# Patient Record
Sex: MALE | Race: WHITE | Employment: FULL TIME | URBAN - NONMETROPOLITAN AREA
[De-identification: names, ages, dates, MRNs, and addresses within clinical notes are randomized per-mention and may not be internally consistent; named-entity substitution may affect disease eponyms.]

---

## 2021-04-25 ENCOUNTER — HOSPITAL ENCOUNTER (EMERGENCY)
Age: 26
Discharge: HOME | End: 2021-04-25
Payer: COMMERCIAL

## 2021-04-25 VITALS
WEIGHT: 215 LBS | HEIGHT: 76 IN | OXYGEN SATURATION: 97 % | DIASTOLIC BLOOD PRESSURE: 78 MMHG | BODY MASS INDEX: 26.18 KG/M2 | RESPIRATION RATE: 16 BRPM | HEART RATE: 75 BPM | SYSTOLIC BLOOD PRESSURE: 136 MMHG | TEMPERATURE: 97.4 F

## 2021-04-25 DIAGNOSIS — L25.5 ALLERGIC DERMATITIS DUE TO POISON VINE: Primary | ICD-10-CM

## 2021-04-25 PROCEDURE — 6360000002 HC RX W HCPCS: Performed by: NURSE PRACTITIONER

## 2021-04-25 PROCEDURE — 99203 OFFICE O/P NEW LOW 30 MIN: CPT

## 2021-04-25 PROCEDURE — 96372 THER/PROPH/DIAG INJ SC/IM: CPT

## 2021-04-25 PROCEDURE — 99202 OFFICE O/P NEW SF 15 MIN: CPT | Performed by: NURSE PRACTITIONER

## 2021-04-25 RX ORDER — DIPHENHYDRAMINE HCL 25 MG
25 CAPSULE ORAL EVERY 6 HOURS PRN
Refills: 0 | COMMUNITY
Start: 2021-04-25 | End: 2021-05-05

## 2021-04-25 RX ORDER — SKIN CLEANSER COMB NO.41
CLEANSER (ML) TOPICAL
Refills: 0 | COMMUNITY
Start: 2021-04-25

## 2021-04-25 RX ORDER — METHYLPREDNISOLONE ACETATE 80 MG/ML
80 INJECTION, SUSPENSION INTRA-ARTICULAR; INTRALESIONAL; INTRAMUSCULAR; SOFT TISSUE ONCE
Status: COMPLETED | OUTPATIENT
Start: 2021-04-25 | End: 2021-04-25

## 2021-04-25 RX ORDER — PREDNISONE 20 MG/1
TABLET ORAL
Qty: 15 TABLET | Refills: 0 | Status: SHIPPED | OUTPATIENT
Start: 2021-04-25 | End: 2021-05-05

## 2021-04-25 RX ADMIN — METHYLPREDNISOLONE ACETATE 80 MG: 80 INJECTION, SUSPENSION INTRA-ARTICULAR; INTRALESIONAL; INTRAMUSCULAR; SOFT TISSUE at 17:03

## 2021-04-25 ASSESSMENT — ENCOUNTER SYMPTOMS
COUGH: 0
CHOKING: 0
CHEST TIGHTNESS: 0
APNEA: 0
COLOR CHANGE: 0
STRIDOR: 0
SHORTNESS OF BREATH: 0
WHEEZING: 0

## 2021-04-25 NOTE — ED NOTES
1730 VS WNL. Injection site is clean, dry and intact. No redness or edema noted. Reviewed discharge instructions and prescriptions with patient and wife. Voiced understanding.      Hever Baig RN  04/25/21 5820

## 2021-04-25 NOTE — ED PROVIDER NOTES
[facility]  Urgent Care Encounter      CHIEF COMPLAINT       Chief Complaint   Patient presents with    Rash     possible poison ivy onset x 4 days progressively worse original site right forearm       Nurses Notes reviewed and I agree except as noted in the HPI. HISTORY OFPRESENT ILLNESS   Mary Ellen Acosta is a 27 y.o. The history is provided by the patient. No  was used. Rash  Location:  Face, shoulder/arm and leg  Facial rash location:  Forehead  Shoulder/arm rash location:  R forearm and L forearm  Leg rash location:  R lower leg and L lower leg  Quality: itchiness, redness and swelling    Quality: not blistering, not bruising, not burning, not draining, not dry, not painful, not peeling, not scaling and not weeping    Severity:  Moderate  Onset quality:  Sudden  Duration:  4 days  Timing:  Constant  Progression:  Spreading  Chronicity:  New  Context: animal contact, insect bite/sting, plant contact and sun exposure    Context: not chemical exposure, not diapers, not eggs, not exposure to similar rash, not food, not hot tub use, not medications, not new detergent/soap, not nuts, not pollen, not pregnancy and not sick contacts    Associated symptoms: no fatigue, no fever, no headaches, no shortness of breath and not wheezing        REVIEW OF SYSTEMS     Review of Systems   Constitutional: Negative for activity change, appetite change, chills, diaphoresis, fatigue, fever and unexpected weight change. Respiratory: Negative for apnea, cough, choking, chest tightness, shortness of breath, wheezing and stridor. Cardiovascular: Negative for chest pain, palpitations and leg swelling. Skin: Positive for rash. Negative for color change, pallor and wound. Neurological: Negative for dizziness, light-headedness and headaches.        PAST MEDICAL HISTORY         Diagnosis Date    Seasonal allergies        SURGICAL HISTORY     Patient  has a past surgical history that includes Achilles tendon surgery (Left, 2019) and Appendectomy (1999). CURRENT MEDICATIONS       Previous Medications    FLUTICASONE PROPIONATE (FLONASE NA)    1 Squirt by Each Nostril route daily       ALLERGIES     Patient is has No Known Allergies. FAMILY HISTORY     Patient's family history is not on file. SOCIAL HISTORY     Patient  reports that he has never smoked. He has never used smokeless tobacco. He reports previous alcohol use. PHYSICAL EXAM     ED TRIAGE VITALS  BP: (!) 152/72, Temp: 97.4 °F (36.3 °C), Pulse: 77, Resp: 16, SpO2: 98 %  Physical Exam  Vitals signs and nursing note reviewed. Exam conducted with a chaperone present. Constitutional:       General: He is not in acute distress. Appearance: Normal appearance. He is normal weight. He is not ill-appearing, toxic-appearing or diaphoretic. HENT:      Head: Normocephalic and atraumatic. Right Ear: External ear normal.      Left Ear: External ear normal.   Eyes:      Extraocular Movements: Extraocular movements intact. Conjunctiva/sclera: Conjunctivae normal.   Neck:      Musculoskeletal: Normal range of motion. Pulmonary:      Effort: Pulmonary effort is normal.   Musculoskeletal: Normal range of motion. Skin:     General: Skin is warm. Findings: Rash present. Neurological:      General: No focal deficit present. Mental Status: He is alert and oriented to person, place, and time. Psychiatric:         Mood and Affect: Mood normal.         Behavior: Behavior normal.         Thought Content: Thought content normal.         Judgment: Judgment normal.         DIAGNOSTIC RESULTS   Labs:No results found for this visit on 04/25/21.     IMAGING:  No orders to display     URGENT CARE COURSE:     Vitals:    04/25/21 1620   BP: (!) 152/72   Pulse: 77   Resp: 16   Temp: 97.4 °F (36.3 °C)   TempSrc: Temporal   SpO2: 98%   Weight: 215 lb (97.5 kg)   Height: 6' 4\" (1.93 m)       Medications   methylPREDNISolone acetate (DEPO-MEDROL) injection 80 mg (80 mg Intramuscular Given 4/25/21 0341)     PROCEDURES:  None  FINAL IMPRESSION      1. Allergic dermatitis due to poison vine        DISPOSITION/PLAN   Decision To Discharge    Take Medication as Directed  Benadryl for itch, Don't scratch  Monitor for any increase in size or spreading  Monitor for fever or chills  Keep drainage covered if any. Follow up with your PCP or return as needed  Or go to the emergency Department    PATIENT REFERRED TO:  [address] [place] [id number]  Call today  [phone]    DISCHARGE MEDICATIONS:  New Prescriptions    DIPHENHYDRAMINE (BENADRYL) 25 MG CAPSULE    Take 1 capsule by mouth every 6 hours as needed for Itching    POISON IVY TREATMENTS (TECNU OUTDOOR SKIN CLEANSER) LIQD    Follow package directions for topical use    PREDNISONE (DELTASONE) 20 MG TABLET    Take 2 tablets by mouth daily for 5 days, THEN 1 tablet daily for 5 days.      Current Discharge Medication List          IZABELA Anderson CNP, APRN - CNP  04/25/21 3673

## 2021-05-07 ENCOUNTER — HOSPITAL ENCOUNTER (EMERGENCY)
Age: 26
Discharge: HOME | End: 2021-05-07
Payer: COMMERCIAL

## 2021-05-07 VITALS
HEART RATE: 68 BPM | BODY MASS INDEX: 26.17 KG/M2 | RESPIRATION RATE: 18 BRPM | WEIGHT: 215 LBS | SYSTOLIC BLOOD PRESSURE: 139 MMHG | OXYGEN SATURATION: 98 % | TEMPERATURE: 98.6 F | DIASTOLIC BLOOD PRESSURE: 85 MMHG

## 2021-05-07 DIAGNOSIS — L25.5 CONTACT DERMATITIS DUE TO PLANT: Primary | ICD-10-CM

## 2021-05-07 DIAGNOSIS — L03.115 CELLULITIS OF RIGHT LOWER EXTREMITY: ICD-10-CM

## 2021-05-07 PROCEDURE — 99213 OFFICE O/P EST LOW 20 MIN: CPT | Performed by: NURSE PRACTITIONER

## 2021-05-07 PROCEDURE — 99213 OFFICE O/P EST LOW 20 MIN: CPT

## 2021-05-07 RX ORDER — PREDNISONE 20 MG/1
TABLET ORAL
Qty: 42 TABLET | Refills: 0 | Status: SHIPPED | OUTPATIENT
Start: 2021-05-07

## 2021-05-07 ASSESSMENT — ENCOUNTER SYMPTOMS
SHORTNESS OF BREATH: 0
COLOR CHANGE: 1
COUGH: 0
EYE REDNESS: 0
EYE ITCHING: 0

## 2021-05-07 NOTE — ED PROVIDER NOTES
[id number]       Chief Complaint   Patient presents with    Rash     right, lower leg       Nurses Notes reviewed and I agree except as noted in the HPI. HISTORY OF PRESENT ILLNESS   Gerson Condon is a 27 y.o. male who presents for re-evaluation of poison ivy, it is spreading. He states that he was seen here in the urgent care almost 2 weeks ago and completed the medications as directed but he is no better. REVIEW OF SYSTEMS     Review of Systems   Constitutional: Negative for chills and fever. Eyes: Negative for redness and itching. Respiratory: Negative for cough and shortness of breath. Cardiovascular: Negative for chest pain. Skin: Positive for color change and rash. Allergic/Immunologic: Negative for environmental allergies and food allergies. Neurological: Negative for dizziness and headaches. PAST MEDICAL HISTORY         Diagnosis Date    Seasonal allergies        SURGICAL HISTORY     Patient  has a past surgical history that includes Achilles tendon surgery (Left, 2019) and Appendectomy (1999). CURRENT MEDICATIONS       Discharge Medication List as of 5/7/2021 10:39 AM      CONTINUE these medications which have NOT CHANGED    Details   Fluticasone Propionate (FLONASE NA) 1 Squirt by Each Nostril route dailyHistorical Med      Poison Ivy Treatments (TECNU OUTDOOR SKIN CLEANSER) LIQD Follow package directions for topical use, [id number]             ALLERGIES     Patient is has No Known Allergies. FAMILY HISTORY     Patient'sfamily history is not on file. SOCIAL HISTORY     Patient  reports that he has never smoked. He has never used smokeless tobacco. He reports previous alcohol use. PHYSICAL EXAM     ED TRIAGE VITALS  BP: 139/85, Temp: 98.6 °F (37 °C), Pulse: 68, Resp: 18, SpO2: 98 %  Physical Exam  Vitals signs and nursing note reviewed. Constitutional:       General: He is not in acute distress. Appearance: Normal appearance. He is well-developed and well-groomed. HENT:      Head: Normocephalic and atraumatic. Right Ear: External ear normal.      Left Ear: External ear normal.      Mouth/Throat:      Lips: Pink. Mouth: Mucous membranes are moist.   Eyes:      Conjunctiva/sclera: Conjunctivae normal.      Right eye: Right conjunctiva is not injected. Left eye: Left conjunctiva is not injected. Pupils: Pupils are equal.   Neck:      Musculoskeletal: Normal range of motion. Cardiovascular:      Rate and Rhythm: Normal rate. Pulmonary:      Effort: Pulmonary effort is normal. No respiratory distress. Skin:     General: Skin is warm and dry. Findings: Rash (consistent with posion ivy scattered on arms and legs, secondary infection noted to the right upper thigh ) present. Neurological:      Mental Status: He is alert and oriented to person, place, and time. Psychiatric:         Speech: Speech normal.         Behavior: Behavior is cooperative. DIAGNOSTIC RESULTS   Labs:  Abnormal Labs Reviewed - No data to display     IMAGING:  No orders to display     URGENT CARE COURSE:     Vitals:    05/07/21 1030   BP: 139/85   Pulse: 68   Resp: 18   Temp: 98.6 °F (37 °C)   TempSrc: Temporal   SpO2: 98%   Weight: 215 lb (97.5 kg)       Medications - No data to display  PROCEDURES:  FINALIMPRESSION      1. Contact dermatitis due to plant    2. Cellulitis of right lower extremity        DISPOSITION/PLAN   DISPOSITION Decision To Discharge 05/07/2021 10:37:24 AM      Physical assessment findings, diagnostic testing(s) if applicable, and vital signs reviewed with patient/patient representative. Questions answered. If applicable, patient/patient representative will be contacted upon receipt of final culture and sensitivity or other testing results when available. Any additions or changes to medications or changes the plan of care will be made at that time.   Medications as directed,

## 2021-05-07 NOTE — ED TRIAGE NOTES
Patient to room with c/o red, raised, itchy rash to right thigh and left low leg begging a week and a half ago. Patient noted additional areas to left left and neck beginning this week. States previous exposure to poison ivy. Completed previously prescribed steroids.

## 2023-01-17 ENCOUNTER — OFFICE VISIT (OUTPATIENT)
Dept: FAMILY MEDICINE CLINIC | Age: 28
End: 2023-01-17
Payer: COMMERCIAL

## 2023-01-17 VITALS
OXYGEN SATURATION: 97 % | TEMPERATURE: 98.8 F | SYSTOLIC BLOOD PRESSURE: 110 MMHG | HEART RATE: 76 BPM | DIASTOLIC BLOOD PRESSURE: 70 MMHG | RESPIRATION RATE: 16 BRPM

## 2023-01-17 DIAGNOSIS — J01.90 ACUTE BACTERIAL SINUSITIS: Primary | ICD-10-CM

## 2023-01-17 DIAGNOSIS — B96.89 ACUTE BACTERIAL SINUSITIS: Primary | ICD-10-CM

## 2023-01-17 PROCEDURE — 99203 OFFICE O/P NEW LOW 30 MIN: CPT | Performed by: NURSE PRACTITIONER

## 2023-01-17 RX ORDER — DOXYCYCLINE HYCLATE 100 MG
100 TABLET ORAL 2 TIMES DAILY
Qty: 14 TABLET | Refills: 0 | Status: SHIPPED | OUTPATIENT
Start: 2023-01-17 | End: 2023-01-24

## 2023-01-17 ASSESSMENT — PATIENT HEALTH QUESTIONNAIRE - PHQ9
SUM OF ALL RESPONSES TO PHQ QUESTIONS 1-9: 0
SUM OF ALL RESPONSES TO PHQ QUESTIONS 1-9: 0
2. FEELING DOWN, DEPRESSED OR HOPELESS: 0
SUM OF ALL RESPONSES TO PHQ9 QUESTIONS 1 & 2: 0
SUM OF ALL RESPONSES TO PHQ QUESTIONS 1-9: 0
1. LITTLE INTEREST OR PLEASURE IN DOING THINGS: 0
SUM OF ALL RESPONSES TO PHQ QUESTIONS 1-9: 0

## 2023-01-17 NOTE — PROGRESS NOTES
[address] [address]  Dept: [phone]  Dept Fax: [phone]  Loc: [id number] Shira Snow is a 32 y.o. male who presents todayfor Cough (X 2-3 weeks - productive at times ) and Congestion      HPI:      Patient presents with:  Cough: X 2-3 weeks - productive at times   Congestion          The patient has No Known Allergies. Past MedicalHistory  Jose Chapa  has a past medical history of Seasonal allergies. Medications    Current Outpatient Medications:     doxycycline hyclate (VIBRA-TABS) 100 MG tablet, Take 1 tablet by mouth 2 times daily for 7 days, Disp: 14 tablet, Rfl: 0    Subjective:      Review of Systems   Constitutional:  Negative for activity change, appetite change, fatigue, fever and unexpected weight change. HENT:  Positive for congestion. Negative for dental problem, ear pain, facial swelling, mouth sores, postnasal drip, sinus pressure, sinus pain, sore throat and trouble swallowing. Eyes:  Negative for pain, discharge, redness, itching and visual disturbance. Respiratory:  Positive for cough. Negative for chest tightness, shortness of breath and wheezing. Cardiovascular:  Negative for chest pain, palpitations and leg swelling. Gastrointestinal:  Negative for abdominal distention, abdominal pain, blood in stool, constipation, diarrhea, nausea, rectal pain and vomiting. Endocrine: Negative for cold intolerance and heat intolerance. Genitourinary:  Negative for difficulty urinating, dysuria, frequency, hematuria, penile pain, scrotal swelling, testicular pain and urgency. Musculoskeletal:  Negative for arthralgias, back pain, gait problem and neck pain. Skin:  Negative for color change, rash and wound. Neurological:  Negative for dizziness, seizures, weakness, light-headedness, numbness and headaches. Psychiatric/Behavioral:  Negative for agitation and sleep disturbance. Objective:        Vitals:    01/17/23 1432   BP: 110/70   Site: Left Upper Arm   Position: Sitting   Pulse: 76   Resp: 16   Temp: 98.8 °F (37.1 °C)   TempSrc: Oral   SpO2: 97%      Physical Exam  Vitals and nursing note reviewed. Constitutional:       General: He is not in acute distress. Appearance: He is well-developed. He is not diaphoretic. HENT:      Head: Normocephalic and atraumatic. Right Ear: Tympanic membrane and external ear normal. Tympanic membrane is not injected or erythematous. Left Ear: Tympanic membrane and external ear normal. Tympanic membrane is not injected or erythematous. Nose: Congestion present. Right Sinus: Maxillary sinus tenderness and frontal sinus tenderness present. Left Sinus: Maxillary sinus tenderness and frontal sinus tenderness present. Mouth/Throat:      Pharynx: Uvula midline. Eyes:      General:         Right eye: No discharge. Left eye: No discharge. Conjunctiva/sclera: Conjunctivae normal.      Pupils: Pupils are equal, round, and reactive to light. Neck:      Thyroid: No thyromegaly. Trachea: Trachea normal.   Cardiovascular:      Rate and Rhythm: Normal rate and regular rhythm. Pulses: Normal pulses. Heart sounds: Normal heart sounds, S1 normal and S2 normal. No murmur heard. No friction rub. No gallop. Pulmonary:      Effort: Pulmonary effort is normal. No respiratory distress. Breath sounds: Normal breath sounds. No wheezing or rales. Chest:      Chest wall: No tenderness. Abdominal:      General: Bowel sounds are normal. There is no distension. Palpations: Abdomen is soft. There is no mass. Tenderness: There is no abdominal tenderness. There is no guarding or rebound. Musculoskeletal:         General: No tenderness or deformity. Normal range of motion. Cervical back: Full passive range of motion without pain, normal range of motion and neck supple.    Lymphadenopathy: Cervical: No cervical adenopathy. Skin:     General: Skin is warm and dry. Capillary Refill: Capillary refill takes less than 2 seconds. Neurological:      Mental Status: He is alert and oriented to person, place, and time. Deep Tendon Reflexes: Reflexes are normal and symmetric. Assessment/Plan:       Kaylie Johnson was seen today for cough and congestion. Diagnoses and all orders for this visit:    Acute bacterial sinusitis  -     doxycycline hyclate (VIBRA-TABS) 100 MG tablet; Take 1 tablet by mouth 2 times daily for 7 days  Pt non toxic appearing and in no acute distress. Exam consistent with bacterial sinusitis. Rx Doxy  Patient instructed to increase fluids and rest. Use Tylenol and or Ibuprofen for fever or pain control. Good hand washing encouraged. Health maintenance labs and vaccines declined at today's visit. Return in about 1 week (around 1/24/2023), or if symptoms worsen or fail to improve. Patient instructions given and reviewed.     Electronicallysigned by IZABELA Benitez CNP on 1/23/2023 at 10:16 AM

## 2023-01-23 ASSESSMENT — ENCOUNTER SYMPTOMS
EYE REDNESS: 0
TROUBLE SWALLOWING: 0
COLOR CHANGE: 0
SINUS PRESSURE: 0
FACIAL SWELLING: 0
CONSTIPATION: 0
EYE PAIN: 0
SINUS PAIN: 0
SORE THROAT: 0
SHORTNESS OF BREATH: 0
ABDOMINAL PAIN: 0
DIARRHEA: 0
RECTAL PAIN: 0
BACK PAIN: 0
EYE ITCHING: 0
NAUSEA: 0
BLOOD IN STOOL: 0
CHEST TIGHTNESS: 0
COUGH: 1
VOMITING: 0
EYE DISCHARGE: 0
WHEEZING: 0
ABDOMINAL DISTENTION: 0

## 2024-07-04 ENCOUNTER — APPOINTMENT (OUTPATIENT)
Dept: ULTRASOUND IMAGING | Age: 29
End: 2024-07-04
Payer: COMMERCIAL

## 2024-07-04 ENCOUNTER — HOSPITAL ENCOUNTER (EMERGENCY)
Age: 29
Discharge: HOME | End: 2024-07-04
Attending: STUDENT IN AN ORGANIZED HEALTH CARE EDUCATION/TRAINING PROGRAM
Payer: COMMERCIAL

## 2024-07-04 VITALS
SYSTOLIC BLOOD PRESSURE: 112 MMHG | HEART RATE: 61 BPM | OXYGEN SATURATION: 97 % | DIASTOLIC BLOOD PRESSURE: 57 MMHG | RESPIRATION RATE: 18 BRPM | TEMPERATURE: 98.2 F

## 2024-07-04 DIAGNOSIS — N45.1 EPIDIDYMITIS: Primary | ICD-10-CM

## 2024-07-04 LAB
BILIRUB UR QL STRIP.AUTO: NEGATIVE
CHARACTER UR: CLEAR
COLOR, UA: YELLOW
GLUCOSE UR QL STRIP.AUTO: NEGATIVE MG/DL
HGB UR QL STRIP.AUTO: NEGATIVE
KETONES UR QL STRIP.AUTO: NEGATIVE
NITRITE UR QL STRIP: NEGATIVE
PH UR STRIP.AUTO: 7 [PH] (ref 5–9)
PROT UR STRIP.AUTO-MCNC: NEGATIVE MG/DL
SP GR UR REFRACT.AUTO: 1.02 (ref 1–1.03)
UROBILINOGEN, URINE: 0.2 EU/DL (ref 0–1)
WBC #/AREA URNS HPF: NEGATIVE /[HPF]

## 2024-07-04 PROCEDURE — 76870 US EXAM SCROTUM: CPT

## 2024-07-04 PROCEDURE — 81003 URINALYSIS AUTO W/O SCOPE: CPT

## 2024-07-04 PROCEDURE — 99284 EMERGENCY DEPT VISIT MOD MDM: CPT

## 2024-07-04 PROCEDURE — 6370000000 HC RX 637 (ALT 250 FOR IP)

## 2024-07-04 RX ORDER — HYDROCODONE BITARTRATE AND ACETAMINOPHEN 5; 325 MG/1; MG/1
1 TABLET ORAL ONCE
Status: DISCONTINUED | OUTPATIENT
Start: 2024-07-04 | End: 2024-07-04

## 2024-07-04 RX ORDER — ACETAMINOPHEN 500 MG
1000 TABLET ORAL ONCE
Status: COMPLETED | OUTPATIENT
Start: 2024-07-04 | End: 2024-07-04

## 2024-07-04 RX ORDER — LEVOFLOXACIN 500 MG/1
500 TABLET, FILM COATED ORAL DAILY
Qty: 10 TABLET | Refills: 0 | Status: SHIPPED | OUTPATIENT
Start: 2024-07-04 | End: 2024-07-14

## 2024-07-04 RX ADMIN — ACETAMINOPHEN 1000 MG: 500 TABLET ORAL at 13:52

## 2024-07-04 ASSESSMENT — PAIN SCALES - GENERAL
PAINLEVEL_OUTOF10: 4
PAINLEVEL_OUTOF10: 3
PAINLEVEL_OUTOF10: 4

## 2024-07-04 ASSESSMENT — PAIN DESCRIPTION - ORIENTATION: ORIENTATION: RIGHT

## 2024-07-04 ASSESSMENT — PAIN - FUNCTIONAL ASSESSMENT
PAIN_FUNCTIONAL_ASSESSMENT: 0-10
PAIN_FUNCTIONAL_ASSESSMENT: 0-10

## 2024-07-04 ASSESSMENT — PAIN DESCRIPTION - LOCATION: LOCATION: SCROTUM

## 2024-07-04 NOTE — ED PROVIDER NOTES
[facility] EMERGENCY DEPARTMENT  EMERGENCY DEPARTMENT ENCOUNTER          Pt Name: Kvng Noguera  MRN: [id number]  Birthdate 1995  Date of evaluation: 7/4/2024  Resident Physician: Ajith Huber MD EM Resident PGY-3  Attending Physician: Keegan Be DO      CHIEF COMPLAINT       Chief Complaint   Patient presents with    Testicle Pain         HISTORY OF PRESENT ILLNESS    HPI  Kvng Noguera is a 29 y.o. male who presents to the emergency department from home, by private vehicle for evaluation of right testicular pain.  Patient states that yesterday he began to have slight discomfort of his right testicle noticed worse with movements such as walking.  Patient denies any known injury prior to pain onset.  Patient endorsing an area of firmness within his right hemiscrotum with increased pain to direct palpation.  Patient denies any dysuria, hematuria, or abnormal discharge from penis.  Patient has inguinal hernia for which she has scheduled surgery next month states that has been chronic with no acute changes.  Patient denies any previous history of testicular pain.      The patient has no other acute complaints at this time.    ROS negative except as stated above.    PAST MEDICAL AND SURGICAL HISTORY     Past Medical History:   Diagnosis Date    Seasonal allergies      Past Surgical History:   Procedure Laterality Date    ACHILLES TENDON SURGERY Left 2019    APPENDECTOMY  1999         MEDICATIONS   No current facility-administered medications for this encounter.    Current Outpatient Medications:     levoFLOXacin (LEVAQUIN) 500 MG tablet, Take 1 tablet by mouth daily for 10 days, Disp: 10 tablet, Rfl: 0    Previous Medications    No medications on file         SOCIAL HISTORY     Social History     Social History Narrative    Not on file     Social History     Tobacco Use    Smoking status: Never    Smokeless tobacco: Never   Vaping Use    Vaping Use: Never used   Substance Use Topics    Alcohol

## 2024-07-04 NOTE — DISCHARGE INSTRUCTIONS
You were seen and evaluated emergency department today for right-sided testicular pain.  Your urine showed no sign of UTI.  Your ultrasound showed epididymitis.  Please take and fill your prescription for Levaquin for the next 10 days.  Follow-up with your primary care provider next 2 days or [facility] for medicine for further evaluation.  Return to emergency department anytime for acute or worrisome changes.

## 2024-07-04 NOTE — ED TRIAGE NOTES
Pt presents to the ED for right sided testicle pain that started yesterday. Pt states he noticed a bump. Pt denies injury.

## 2024-08-27 ENCOUNTER — HOSPITAL ENCOUNTER (EMERGENCY)
Age: 29
Discharge: HOME | End: 2024-08-27
Payer: COMMERCIAL

## 2024-08-27 VITALS
OXYGEN SATURATION: 97 % | WEIGHT: 225 LBS | BODY MASS INDEX: 27.39 KG/M2 | RESPIRATION RATE: 20 BRPM | TEMPERATURE: 98.9 F | HEART RATE: 92 BPM

## 2024-08-27 DIAGNOSIS — L01.00 IMPETIGO: Primary | ICD-10-CM

## 2024-08-27 PROCEDURE — 99213 OFFICE O/P EST LOW 20 MIN: CPT

## 2024-08-27 RX ORDER — CEPHALEXIN 500 MG/1
500 CAPSULE ORAL 4 TIMES DAILY
Qty: 40 CAPSULE | Refills: 0 | Status: SHIPPED | OUTPATIENT
Start: 2024-08-27 | End: 2024-09-06

## 2024-08-27 ASSESSMENT — ENCOUNTER SYMPTOMS
VOMITING: 0
CHEST TIGHTNESS: 0
NAUSEA: 0
COUGH: 0
SHORTNESS OF BREATH: 0
WHEEZING: 0
SORE THROAT: 0
ABDOMINAL PAIN: 0
EYE DISCHARGE: 0
DIARRHEA: 0

## 2024-08-27 ASSESSMENT — PAIN - FUNCTIONAL ASSESSMENT: PAIN_FUNCTIONAL_ASSESSMENT: NONE - DENIES PAIN

## 2024-08-27 NOTE — DISCHARGE INSTRUCTIONS
I did send in Keflex for him.  You can use the cream, for your wife.  If no improvement then I recommend using the Keflex.  Monitor for any signs and symptoms of infection.  Make sure that no spots get close to your postsurgical intervention.

## 2024-08-27 NOTE — ED TRIAGE NOTES
Patient to room with c/o raised, seeping rash to left forearm beginning three days ago. States exposure to impetigo.

## 2024-08-27 NOTE — ED PROVIDER NOTES
[facility] URGENT CARE  Urgent Care Encounter       CHIEF COMPLAINT       Chief Complaint   Patient presents with    Rash     Left forearm         Nurses Notes reviewed and I agree except as noted in the HPI.  HISTORY OF PRESENT ILLNESS   Kvng Noguera is a 29 y.o. male who presents to [place] urgent care for evaluation of a rash to his left forearm.  Patient's wife reporting that they were exposed to impetigo.  Wife reports that she also has impetigo.  Patient denies any pain or significant itching.  Patient recently had surgery, and still has drains to the abdomen.  Patient denies any known spots to the abdomen.  Patient denies any fevers or systemic response to the rash.  Patient has a follow-up appointment this week. Pt denies any fever, chills, fatigue, SOB, CP, light-headedness or dizziness, numbness or tingling, abd pain, N/V/D, constipation or urinary complaints.      The history is provided by the patient. No  was used.       REVIEW OF SYSTEMS     Review of Systems   Constitutional:  Negative for chills, fatigue and fever.   HENT:  Negative for congestion, ear pain and sore throat.    Eyes:  Negative for discharge.   Respiratory:  Negative for cough, chest tightness, shortness of breath and wheezing.    Cardiovascular:  Negative for chest pain.   Gastrointestinal:  Negative for abdominal pain, diarrhea, nausea and vomiting.   Genitourinary:  Negative for difficulty urinating.   Skin:  Positive for rash.   Neurological:  Negative for dizziness and numbness.   Psychiatric/Behavioral:  Negative for suicidal ideas.    All other systems reviewed and are negative.      PAST MEDICAL HISTORY         Diagnosis Date    Seasonal allergies        SURGICALHISTORY     Patient  has a past surgical history that includes Achilles tendon surgery (Left, 2019); Appendectomy (1999); and hernia repair.    CURRENT MEDICATIONS       Discharge Medication List as of 8/27/2024  6:21 PM

## 2024-08-31 ENCOUNTER — HOSPITAL ENCOUNTER (EMERGENCY)
Age: 29
Discharge: HOME | End: 2024-08-31
Payer: COMMERCIAL

## 2024-08-31 VITALS
OXYGEN SATURATION: 100 % | TEMPERATURE: 98.9 F | DIASTOLIC BLOOD PRESSURE: 69 MMHG | RESPIRATION RATE: 16 BRPM | HEART RATE: 71 BPM | SYSTOLIC BLOOD PRESSURE: 107 MMHG

## 2024-08-31 DIAGNOSIS — R23.8 VESICULAR RASH: Primary | ICD-10-CM

## 2024-08-31 PROCEDURE — 99213 OFFICE O/P EST LOW 20 MIN: CPT

## 2024-08-31 PROCEDURE — 99213 OFFICE O/P EST LOW 20 MIN: CPT | Performed by: NURSE PRACTITIONER

## 2024-08-31 RX ORDER — DIPHENHYDRAMINE HCL 25 MG
25 CAPSULE ORAL EVERY 6 HOURS PRN
COMMUNITY
Start: 2024-08-31 | End: 2024-09-10

## 2024-08-31 RX ORDER — PREDNISONE 20 MG/1
TABLET ORAL
Qty: 15 TABLET | Refills: 0 | Status: SHIPPED | OUTPATIENT
Start: 2024-08-31 | End: 2024-09-10

## 2024-08-31 ASSESSMENT — ENCOUNTER SYMPTOMS
CHEST TIGHTNESS: 0
STRIDOR: 0
APNEA: 0
SORE THROAT: 0
CHOKING: 0
PERI-ORBITAL EDEMA: 0
WHEEZING: 0
COUGH: 0
SHORTNESS OF BREATH: 0
ABDOMINAL PAIN: 0
THROAT SWELLING: 0
HOARSE VOICE: 0
VOMITING: 0
NAUSEA: 0
DIARRHEA: 0

## 2024-08-31 NOTE — ED PROVIDER NOTES
[facility] URGENT CARE  Urgent Care Encounter      CHIEF COMPLAINT       Chief Complaint   Patient presents with    Rash     Left arm   on 4th day of atb, still seeping  worried atb is not working.       Nurses Notes reviewed and I agree except as noted in the HPI.  HISTORY OFPRESENT ILLNESS   Kvng Noguera is a 29 y.o.  The history is provided by the patient. No  was used.   Rash  Location:  Shoulder/arm  Shoulder/arm rash location:  L forearm and R forearm  Quality: blistering, itchiness, redness and weeping    Quality: not bruising, not burning, not draining, not dry, not painful, not peeling, not scaling and not swelling    Severity:  Severe  Onset quality:  Gradual  Duration:  7 days  Timing:  Constant  Progression:  Waxing and waning  Chronicity:  New  Context: exposure to similar rash    Context: not animal contact, not chemical exposure, not diapers, not eggs, not food, not hot tub use, not insect bite/sting, not medications, not new detergent/soap, not nuts, not plant contact, not pollen, not pregnancy, not sick contacts and not sun exposure    Relieved by:  Nothing  Worsened by:  Heat, moisture and contact  Ineffective treatments:  Antibiotic cream (oral antibiotics)  Associated symptoms: induration    Associated symptoms: no abdominal pain, no diarrhea, no fatigue, no fever, no headaches, no hoarse voice, no joint pain, no myalgias, no nausea, no periorbital edema, no shortness of breath, no sore throat, no throat swelling, no tongue swelling, no URI, not vomiting and not wheezing        REVIEW OF SYSTEMS     Review of Systems   Constitutional:  Negative for activity change, appetite change, chills, diaphoresis, fatigue and fever.   HENT:  Negative for hoarse voice and sore throat.    Respiratory:  Negative for apnea, cough, choking, chest tightness, shortness of breath, wheezing and stridor.    Cardiovascular:  Negative for chest pain, palpitations and leg swelling.

## 2025-05-07 ENCOUNTER — HOSPITAL ENCOUNTER (OUTPATIENT)
Age: 30
Setting detail: OBSERVATION
Discharge: HOME | End: 2025-05-08
Attending: EMERGENCY MEDICINE
Payer: COMMERCIAL

## 2025-05-07 ENCOUNTER — APPOINTMENT (OUTPATIENT)
Dept: GENERAL RADIOLOGY | Age: 30
End: 2025-05-07
Payer: COMMERCIAL

## 2025-05-07 DIAGNOSIS — I48.0 PAROXYSMAL ATRIAL FIBRILLATION (HCC): ICD-10-CM

## 2025-05-07 DIAGNOSIS — I48.91 ATRIAL FIBRILLATION WITH RVR (HCC): Primary | ICD-10-CM

## 2025-05-07 LAB
ANION GAP SERPL CALC-SCNC: 12 MEQ/L (ref 8–16)
APTT PPP: 33.2 SECONDS (ref 22–38)
BASOPHILS ABSOLUTE: 0 THOU/MM3 (ref 0–0.1)
BASOPHILS NFR BLD AUTO: 0.2 %
BUN SERPL-MCNC: 21 MG/DL (ref 8–23)
CALCIUM SERPL-MCNC: 9.3 MG/DL (ref 8.6–10)
CHLORIDE SERPL-SCNC: 101 MEQ/L (ref 98–111)
CO2 SERPL-SCNC: 26 MEQ/L (ref 22–29)
CREAT SERPL-MCNC: 1.5 MG/DL (ref 0.7–1.2)
DEPRECATED RDW RBC AUTO: 37.2 FL (ref 35–45)
EOSINOPHIL NFR BLD AUTO: 1.3 %
EOSINOPHILS ABSOLUTE: 0.1 THOU/MM3 (ref 0–0.4)
ERYTHROCYTE [DISTWIDTH] IN BLOOD BY AUTOMATED COUNT: 12.6 % (ref 11.5–14.5)
GFR SERPL CREATININE-BSD FRML MDRD: 63 ML/MIN/1.73M2
GLUCOSE SERPL-MCNC: 89 MG/DL (ref 74–109)
HCT VFR BLD AUTO: 41.8 % (ref 42–52)
HGB BLD-MCNC: 14.2 GM/DL (ref 14–18)
IMM GRANULOCYTES # BLD AUTO: 0.02 THOU/MM3 (ref 0–0.07)
IMM GRANULOCYTES NFR BLD AUTO: 0.2 %
INR PPP: 1.1 (ref 0.85–1.13)
LACTATE SERPL-SCNC: 1.1 MMOL/L (ref 0.5–2)
LYMPHOCYTES ABSOLUTE: 3.1 THOU/MM3 (ref 1–4.8)
LYMPHOCYTES NFR BLD AUTO: 37.3 %
MAGNESIUM SERPL-MCNC: 2 MG/DL (ref 1.6–2.6)
MCH RBC QN AUTO: 27.6 PG (ref 26–33)
MCHC RBC AUTO-ENTMCNC: 34 GM/DL (ref 32.2–35.5)
MCV RBC AUTO: 81.2 FL (ref 80–94)
MONOCYTES ABSOLUTE: 0.7 THOU/MM3 (ref 0.4–1.3)
MONOCYTES NFR BLD AUTO: 8.5 %
NEUTROPHILS ABSOLUTE: 4.4 THOU/MM3 (ref 1.8–7.7)
NEUTROPHILS NFR BLD AUTO: 52.5 %
NRBC BLD AUTO-RTO: 0 /100 WBC
NT-PROBNP SERPL IA-MCNC: 64 PG/ML (ref 0–124)
OSMOLALITY SERPL CALC.SUM OF ELEC: 280 MOSMOL/KG (ref 275–300)
PLATELET # BLD AUTO: 236 THOU/MM3 (ref 130–400)
PMV BLD AUTO: 10.1 FL (ref 9.4–12.4)
POTASSIUM SERPL-SCNC: 3.9 MEQ/L (ref 3.5–5.2)
RBC # BLD AUTO: 5.15 MILL/MM3 (ref 4.7–6.1)
SODIUM SERPL-SCNC: 139 MEQ/L (ref 135–145)
TROPONIN, HIGH SENSITIVITY: 13 NG/L (ref 0–12)
TROPONIN, HIGH SENSITIVITY: 7 NG/L (ref 0–12)
TSH SERPL DL<=0.05 MIU/L-ACNC: 1.63 UIU/ML (ref 0.27–4.2)
WBC # BLD AUTO: 8.3 THOU/MM3 (ref 4.8–10.8)

## 2025-05-07 PROCEDURE — 36415 COLL VENOUS BLD VENIPUNCTURE: CPT

## 2025-05-07 PROCEDURE — 84484 ASSAY OF TROPONIN QUANT: CPT

## 2025-05-07 PROCEDURE — 71045 X-RAY EXAM CHEST 1 VIEW: CPT

## 2025-05-07 PROCEDURE — 99285 EMERGENCY DEPT VISIT HI MDM: CPT

## 2025-05-07 PROCEDURE — 84443 ASSAY THYROID STIM HORMONE: CPT

## 2025-05-07 PROCEDURE — 96365 THER/PROPH/DIAG IV INF INIT: CPT

## 2025-05-07 PROCEDURE — 85520 HEPARIN ASSAY: CPT

## 2025-05-07 PROCEDURE — 93005 ELECTROCARDIOGRAM TRACING: CPT | Performed by: EMERGENCY MEDICINE

## 2025-05-07 PROCEDURE — 96374 THER/PROPH/DIAG INJ IV PUSH: CPT

## 2025-05-07 PROCEDURE — 85610 PROTHROMBIN TIME: CPT

## 2025-05-07 PROCEDURE — 99223 1ST HOSP IP/OBS HIGH 75: CPT

## 2025-05-07 PROCEDURE — 85025 COMPLETE CBC W/AUTO DIFF WBC: CPT

## 2025-05-07 PROCEDURE — 85730 THROMBOPLASTIN TIME PARTIAL: CPT

## 2025-05-07 PROCEDURE — 96366 THER/PROPH/DIAG IV INF ADDON: CPT

## 2025-05-07 PROCEDURE — 83605 ASSAY OF LACTIC ACID: CPT

## 2025-05-07 PROCEDURE — 2580000003 HC RX 258: Performed by: EMERGENCY MEDICINE

## 2025-05-07 PROCEDURE — 2500000003 HC RX 250 WO HCPCS: Performed by: EMERGENCY MEDICINE

## 2025-05-07 PROCEDURE — 80048 BASIC METABOLIC PNL TOTAL CA: CPT

## 2025-05-07 PROCEDURE — G0378 HOSPITAL OBSERVATION PER HR: HCPCS

## 2025-05-07 PROCEDURE — 83880 ASSAY OF NATRIURETIC PEPTIDE: CPT

## 2025-05-07 PROCEDURE — 83735 ASSAY OF MAGNESIUM: CPT

## 2025-05-07 PROCEDURE — 71045 X-RAY EXAM CHEST 1 VIEW: CPT | Performed by: RADIOLOGY

## 2025-05-07 RX ORDER — HEPARIN SODIUM 1000 [USP'U]/ML
4000 INJECTION, SOLUTION INTRAVENOUS; SUBCUTANEOUS PRN
Status: DISCONTINUED | OUTPATIENT
Start: 2025-05-07 | End: 2025-05-08

## 2025-05-07 RX ORDER — HEPARIN SODIUM 1000 [USP'U]/ML
2000 INJECTION, SOLUTION INTRAVENOUS; SUBCUTANEOUS PRN
Status: DISCONTINUED | OUTPATIENT
Start: 2025-05-07 | End: 2025-05-08

## 2025-05-07 RX ORDER — DILTIAZEM HYDROCHLORIDE 5 MG/ML
10 INJECTION INTRAVENOUS ONCE
Status: COMPLETED | OUTPATIENT
Start: 2025-05-07 | End: 2025-05-07

## 2025-05-07 RX ORDER — HEPARIN SODIUM 1000 [USP'U]/ML
4000 INJECTION, SOLUTION INTRAVENOUS; SUBCUTANEOUS ONCE
Status: COMPLETED | OUTPATIENT
Start: 2025-05-07 | End: 2025-05-08

## 2025-05-07 RX ORDER — POTASSIUM CHLORIDE 1500 MG/1
20 TABLET, EXTENDED RELEASE ORAL ONCE
Status: COMPLETED | OUTPATIENT
Start: 2025-05-07 | End: 2025-05-08

## 2025-05-07 RX ORDER — HEPARIN SODIUM 10000 [USP'U]/100ML
5-30 INJECTION, SOLUTION INTRAVENOUS CONTINUOUS
Status: DISCONTINUED | OUTPATIENT
Start: 2025-05-07 | End: 2025-05-08

## 2025-05-07 RX ADMIN — DILTIAZEM HYDROCHLORIDE 10 MG: 5 INJECTION, SOLUTION INTRAVENOUS at 21:19

## 2025-05-07 RX ADMIN — DILTIAZEM HYDROCHLORIDE 5 MG/HR: 5 INJECTION, SOLUTION INTRAVENOUS at 21:20

## 2025-05-07 ASSESSMENT — ENCOUNTER SYMPTOMS
COUGH: 0
ABDOMINAL PAIN: 0
EYE PAIN: 0
SORE THROAT: 0
RHINORRHEA: 0
SHORTNESS OF BREATH: 0

## 2025-05-07 ASSESSMENT — PAIN - FUNCTIONAL ASSESSMENT: PAIN_FUNCTIONAL_ASSESSMENT: NONE - DENIES PAIN

## 2025-05-08 ENCOUNTER — APPOINTMENT (OUTPATIENT)
Age: 30
End: 2025-05-08
Attending: INTERNAL MEDICINE
Payer: COMMERCIAL

## 2025-05-08 VITALS
OXYGEN SATURATION: 98 % | TEMPERATURE: 98.3 F | BODY MASS INDEX: 27.64 KG/M2 | WEIGHT: 227 LBS | DIASTOLIC BLOOD PRESSURE: 66 MMHG | RESPIRATION RATE: 18 BRPM | HEART RATE: 56 BPM | SYSTOLIC BLOOD PRESSURE: 120 MMHG | HEIGHT: 76 IN

## 2025-05-08 LAB
ANION GAP SERPL CALC-SCNC: 12 MEQ/L (ref 8–16)
BUN SERPL-MCNC: 20 MG/DL (ref 8–23)
CALCIUM SERPL-MCNC: 8.9 MG/DL (ref 8.6–10)
CHLORIDE SERPL-SCNC: 105 MEQ/L (ref 98–111)
CO2 SERPL-SCNC: 22 MEQ/L (ref 22–29)
CREAT SERPL-MCNC: 1 MG/DL (ref 0.7–1.2)
ECHO AO ASC DIAM: 3 CM
ECHO AO ASCENDING AORTA INDEX: 1.28 CM/M2
ECHO AV CUSP MM: 2.1 CM
ECHO AV PEAK GRADIENT: 5 MMHG
ECHO AV PEAK VELOCITY: 1.1 M/S
ECHO AV VELOCITY RATIO: 0.91
ECHO BSA: 2.35 M2
ECHO BSA: 2.35 M2
ECHO EST RA PRESSURE: 3 MMHG
ECHO LA AREA 2C: 12.4 CM2
ECHO LA AREA 4C: 12.8 CM2
ECHO LA DIAMETER INDEX: 1.24 CM/M2
ECHO LA DIAMETER: 2.9 CM
ECHO LA MAJOR AXIS: 5.2 CM
ECHO LA MINOR AXIS: 4.5 CM
ECHO LA VOL BP: 29 ML (ref 18–58)
ECHO LA VOL MOD A2C: 29 ML (ref 18–58)
ECHO LA VOL MOD A4C: 25 ML (ref 18–58)
ECHO LA VOL/BSA BIPLANE: 12 ML/M2 (ref 16–34)
ECHO LA VOLUME INDEX MOD A2C: 12 ML/M2 (ref 16–34)
ECHO LA VOLUME INDEX MOD A4C: 11 ML/M2 (ref 16–34)
ECHO LV E' LATERAL VELOCITY: 16.3 CM/S
ECHO LV E' SEPTAL VELOCITY: 10.8 CM/S
ECHO LV EF PHYSICIAN: 55 %
ECHO LV FRACTIONAL SHORTENING: 32 % (ref 28–44)
ECHO LV INTERNAL DIMENSION DIASTOLE INDEX: 2.14 CM/M2
ECHO LV INTERNAL DIMENSION DIASTOLIC: 5 CM (ref 4.2–5.9)
ECHO LV INTERNAL DIMENSION SYSTOLIC INDEX: 1.45 CM/M2
ECHO LV INTERNAL DIMENSION SYSTOLIC: 3.4 CM
ECHO LV ISOVOLUMETRIC RELAXATION TIME (IVRT): 85 MS
ECHO LV IVSD: 1 CM (ref 0.6–1)
ECHO LV MASS 2D: 169.9 G (ref 88–224)
ECHO LV MASS INDEX 2D: 72.6 G/M2 (ref 49–115)
ECHO LV POSTERIOR WALL DIASTOLIC: 0.9 CM (ref 0.6–1)
ECHO LV RELATIVE WALL THICKNESS RATIO: 0.36
ECHO LVOT PEAK GRADIENT: 4 MMHG
ECHO LVOT PEAK VELOCITY: 1 M/S
ECHO MV A VELOCITY: 0.49 M/S
ECHO MV E DECELERATION TIME (DT): 213 MS
ECHO MV E VELOCITY: 0.7 M/S
ECHO MV E/A RATIO: 1.43
ECHO MV E/E' LATERAL: 4.29
ECHO MV E/E' RATIO (AVERAGED): 5.39
ECHO MV E/E' SEPTAL: 6.48
ECHO PV MAX VELOCITY: 0.6 M/S
ECHO PV PEAK GRADIENT: 2 MMHG
ECHO RV INTERNAL DIMENSION: 2.8 CM
ECHO RV TAPSE: 2 CM (ref 1.7–?)
ECHO TV E WAVE: 0.4 M/S
EKG ATRIAL RATE: 61 BPM
EKG P AXIS: -22 DEGREES
EKG P-R INTERVAL: 170 MS
EKG Q-T INTERVAL: 304 MS
EKG Q-T INTERVAL: 426 MS
EKG QRS DURATION: 78 MS
EKG QRS DURATION: 90 MS
EKG QTC CALCULATION (BAZETT): 428 MS
EKG QTC CALCULATION (BAZETT): 483 MS
EKG R AXIS: 83 DEGREES
EKG R AXIS: 85 DEGREES
EKG T AXIS: 58 DEGREES
EKG T AXIS: 62 DEGREES
EKG VENTRICULAR RATE: 152 BPM
EKG VENTRICULAR RATE: 61 BPM
GFR SERPL CREATININE-BSD FRML MDRD: > 90 ML/MIN/1.73M2
GLUCOSE SERPL-MCNC: 98 MG/DL (ref 74–109)
HEPARIN UNFRACTIONATED: 0.15 U/ML (ref 0.3–0.7)
HEPARIN UNFRACTIONATED: < 0.04 U/ML (ref 0.3–0.7)
MAGNESIUM SERPL-MCNC: 2.3 MG/DL (ref 1.6–2.6)
POTASSIUM SERPL-SCNC: 4.1 MEQ/L (ref 3.5–5.2)
SODIUM SERPL-SCNC: 139 MEQ/L (ref 135–145)

## 2025-05-08 PROCEDURE — 93005 ELECTROCARDIOGRAM TRACING: CPT

## 2025-05-08 PROCEDURE — 99223 1ST HOSP IP/OBS HIGH 75: CPT | Performed by: INTERNAL MEDICINE

## 2025-05-08 PROCEDURE — 96368 THER/DIAG CONCURRENT INF: CPT

## 2025-05-08 PROCEDURE — 36415 COLL VENOUS BLD VENIPUNCTURE: CPT

## 2025-05-08 PROCEDURE — 99239 HOSP IP/OBS DSCHRG MGMT >30: CPT | Performed by: INTERNAL MEDICINE

## 2025-05-08 PROCEDURE — 93306 TTE W/DOPPLER COMPLETE: CPT | Performed by: INTERNAL MEDICINE

## 2025-05-08 PROCEDURE — G0378 HOSPITAL OBSERVATION PER HR: HCPCS

## 2025-05-08 PROCEDURE — 93010 ELECTROCARDIOGRAM REPORT: CPT | Performed by: INTERNAL MEDICINE

## 2025-05-08 PROCEDURE — 93270 REMOTE 30 DAY ECG REV/REPORT: CPT

## 2025-05-08 PROCEDURE — 96366 THER/PROPH/DIAG IV INF ADDON: CPT

## 2025-05-08 PROCEDURE — 93306 TTE W/DOPPLER COMPLETE: CPT

## 2025-05-08 PROCEDURE — 85520 HEPARIN ASSAY: CPT

## 2025-05-08 PROCEDURE — 6360000002 HC RX W HCPCS

## 2025-05-08 PROCEDURE — 80048 BASIC METABOLIC PNL TOTAL CA: CPT

## 2025-05-08 PROCEDURE — 83735 ASSAY OF MAGNESIUM: CPT

## 2025-05-08 PROCEDURE — 6370000000 HC RX 637 (ALT 250 FOR IP)

## 2025-05-08 RX ORDER — ASPIRIN 325 MG
325 TABLET ORAL DAILY
Qty: 30 TABLET | Refills: 3 | Status: SHIPPED | OUTPATIENT
Start: 2025-05-08

## 2025-05-08 RX ORDER — ACETAMINOPHEN 650 MG/1
650 SUPPOSITORY RECTAL EVERY 6 HOURS PRN
Status: DISCONTINUED | OUTPATIENT
Start: 2025-05-08 | End: 2025-05-08 | Stop reason: HOSPADM

## 2025-05-08 RX ORDER — POTASSIUM CHLORIDE 7.45 MG/ML
10 INJECTION INTRAVENOUS PRN
Status: DISCONTINUED | OUTPATIENT
Start: 2025-05-08 | End: 2025-05-08 | Stop reason: HOSPADM

## 2025-05-08 RX ORDER — SODIUM CHLORIDE 0.9 % (FLUSH) 0.9 %
5-40 SYRINGE (ML) INJECTION EVERY 12 HOURS SCHEDULED
Status: DISCONTINUED | OUTPATIENT
Start: 2025-05-08 | End: 2025-05-08 | Stop reason: HOSPADM

## 2025-05-08 RX ORDER — ONDANSETRON 2 MG/ML
4 INJECTION INTRAMUSCULAR; INTRAVENOUS EVERY 6 HOURS PRN
Status: DISCONTINUED | OUTPATIENT
Start: 2025-05-08 | End: 2025-05-08 | Stop reason: HOSPADM

## 2025-05-08 RX ORDER — POLYETHYLENE GLYCOL 3350 17 G/17G
17 POWDER, FOR SOLUTION ORAL DAILY PRN
Status: DISCONTINUED | OUTPATIENT
Start: 2025-05-08 | End: 2025-05-08 | Stop reason: HOSPADM

## 2025-05-08 RX ORDER — SODIUM CHLORIDE 0.9 % (FLUSH) 0.9 %
5-40 SYRINGE (ML) INJECTION PRN
Status: DISCONTINUED | OUTPATIENT
Start: 2025-05-08 | End: 2025-05-08 | Stop reason: HOSPADM

## 2025-05-08 RX ORDER — SODIUM CHLORIDE 9 MG/ML
INJECTION, SOLUTION INTRAVENOUS PRN
Status: DISCONTINUED | OUTPATIENT
Start: 2025-05-08 | End: 2025-05-08 | Stop reason: HOSPADM

## 2025-05-08 RX ORDER — POTASSIUM CHLORIDE 1500 MG/1
40 TABLET, EXTENDED RELEASE ORAL PRN
Status: DISCONTINUED | OUTPATIENT
Start: 2025-05-08 | End: 2025-05-08 | Stop reason: HOSPADM

## 2025-05-08 RX ORDER — MAGNESIUM SULFATE IN WATER 40 MG/ML
2000 INJECTION, SOLUTION INTRAVENOUS PRN
Status: DISCONTINUED | OUTPATIENT
Start: 2025-05-08 | End: 2025-05-08 | Stop reason: HOSPADM

## 2025-05-08 RX ORDER — ACETAMINOPHEN 325 MG/1
650 TABLET ORAL EVERY 6 HOURS PRN
Status: DISCONTINUED | OUTPATIENT
Start: 2025-05-08 | End: 2025-05-08 | Stop reason: HOSPADM

## 2025-05-08 RX ORDER — ONDANSETRON 4 MG/1
4 TABLET, ORALLY DISINTEGRATING ORAL EVERY 8 HOURS PRN
Status: DISCONTINUED | OUTPATIENT
Start: 2025-05-08 | End: 2025-05-08 | Stop reason: HOSPADM

## 2025-05-08 RX ADMIN — HEPARIN SODIUM 4000 UNITS: 1000 INJECTION INTRAVENOUS; SUBCUTANEOUS at 00:29

## 2025-05-08 RX ADMIN — HEPARIN SODIUM 2000 UNITS: 1000 INJECTION INTRAVENOUS; SUBCUTANEOUS at 07:09

## 2025-05-08 RX ADMIN — POTASSIUM CHLORIDE 20 MEQ: 1500 TABLET, EXTENDED RELEASE ORAL at 00:29

## 2025-05-08 RX ADMIN — HEPARIN SODIUM 9 UNITS/KG/HR: 10000 INJECTION, SOLUTION INTRAVENOUS at 00:29

## 2025-05-08 NOTE — DISCHARGE SUMMARY
Discharge Summary    Patient:  Kvng Noguera  YOB: 1995    MRN: [id number]   Acct: [id number]    Primary Care Physician: Mona Hernandez APRN - NP    Admit date:  5/7/2025    Discharge date:   5/8/2025      Discharge Diagnoses:   New onset atrial fibrillation (HCC)  Principal Problem:    New onset atrial fibrillation (HCC)  Resolved Problems:    * No resolved hospital problems. *        Admitted for: (HPI) atrial fibrillation with RVR    Hospital Course:30 y.o. male without prior cardiac history presents with Afib RVR.  He had palpitations and dizziness that started after mowing the grass.  He went to take a shower, felt presyncope, heart was beating out of chest.  Had to lay down.  He went to [employer].  Wife said he became pale. Never had previously.  He does drink coffee.  No a/t/d.  No recent sick contacts.  ECG now shows NSR.  No chest pain, angina, COLUNGA, orthopnea, PND, sob at rest, palpitations, LE edema, or syncope.  He was started on Heparin gtt and Diltiazem gtt.  Labs wnl.  TSH wnl.  Hgb 14.  Cr 1.0.  He is on no home meds.  He is a  and is very physically fit.  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 No family hx of SCD.     The patient required transient diltiazem drip it was stopped since the patient had converted to sinus rhythm overnight he has been in sinus rhythm this morning and telemetry and at bedside by palpation he has no chest pain his heparin and nitroglycerin were stopped this morning, he was evaluated by cardiology he has undergone echocardiogram I do not have the official results back yet but initial screening of the echo seems to show a grossly intact EF and grossly normal valvular functions, he has had low normal blood pressures, I have asked the patient to take 325 mg of aspirin daily as recommended by cardiology of asked him to return his monitor within 30 days to place assigned and to follow-up with cardiology within 5 to 6 weeks of  Troponin, High Sensitivity 7 0 - 12 ng/L   Magnesium   Result Value Ref Range    Magnesium 2.0 1.6 - 2.6 mg/dL   Lactic Acid   Result Value Ref Range    Lactic Acid 1.1 0.5 - 2.0 mmol/L   APTT   Result Value Ref Range    aPTT 33.2 22.0 - 38.0 seconds   Protime-INR   Result Value Ref Range    INR 1.10 0.85 - 1.13   Anti-XA, Heparin   Result Value Ref Range    Heparin Unfractionated 0.15 (L) 0.30 - 0.70 U/ml   Anti-XA, Heparin   Result Value Ref Range    Heparin Unfractionated < 0.04 (L) 0.30 - 0.70 U/ml   Basic Metabolic Panel   Result Value Ref Range    Sodium 139 135 - 145 meq/L    Potassium 4.1 3.5 - 5.2 meq/L    Chloride 105 98 - 111 meq/L    CO2 22 22 - 29 meq/L    Glucose 98 74 - 109 mg/dL    BUN 20 8 - 23 mg/dL    Creatinine 1.0 0.7 - 1.2 mg/dL    Calcium 8.9 8.6 - 10.0 mg/dL   Magnesium   Result Value Ref Range    Magnesium 2.3 1.6 - 2.6 mg/dL   Anion Gap   Result Value Ref Range    Anion Gap 12.0 8.0 - 16.0 meq/L   Glomerular Filtration Rate, Estimated   Result Value Ref Range    Est, Glom Filt Rate > 90 >60 ml/min/1.73m2   EKG 12 Lead   Result Value Ref Range    Ventricular Rate 152 BPM    QRS Duration 78 ms    Q-T Interval 304 ms    QTc Calculation (Bazett) 483 ms    R Axis 83 degrees    T Axis 62 degrees   EKG 12 Lead   Result Value Ref Range    Ventricular Rate 61 BPM    Atrial Rate 61 BPM    P-R Interval 170 ms    QRS Duration 90 ms    Q-T Interval 426 ms    QTc Calculation (Bazett) 428 ms    P Axis -22 degrees    R Axis 85 degrees    T Axis 58 degrees       Diet:  ADULT DIET; Regular    Activity:  Activity as tolerated (Patient may move about with assist as indicated or with supervision.)    Follow-up:  in the next few days with Mona Hernandez APRN - NP,  in the next few weeks ideally please return to ER if symptoms return but please wear your cardiac monitor for 30 days and return to the place you are instructed to return to and please follow-up with cardiology within 5 to 6 weeks of

## 2025-05-08 NOTE — ED NOTES
Spoke to B staff who approved patient transfer to 8A-07. Patient transported upstairs in stable conditon.

## 2025-05-08 NOTE — PLAN OF CARE
Problem: Discharge Planning  Goal: Discharge to home or other facility with appropriate resources  Outcome: Progressing  Flowsheets (Taken 5/7/2025 2340 by Jenna Kaur, RN, BSN)  Discharge to home or other facility with appropriate resources: Identify discharge learning needs (meds, wound care, etc)     Problem: Safety - Adult  Goal: Free from fall injury  Outcome: Progressing

## 2025-05-08 NOTE — ED PROVIDER NOTES
[facility]  EMERGENCY DEPARTMENT ENCOUNTER      Pt Name: Kvng Noguera  MRN: [id number]  Birthdate 1995  Date of evaluation: 5/7/2025  Provider: Jr Nelson DO  1:40 AM    CHIEF COMPLAINT       Chief Complaint   Patient presents with    Irregular Heart Beat         HISTORY OF PRESENT ILLNESS    Kvng Noguera is a 30 y.o. male who presents to the emergency department with a CC of palpitations and dizziness.  This started this evening after he mowed his lawn. He states he felt some fluttering in his chest and his wife states he was very pale.  No inciting factors, no aggravating or alleviating factors.       HPI    Nursing Notes were reviewed.    REVIEW OF SYSTEMS       Review of Systems   Constitutional:  Negative for chills and fever.   HENT:  Negative for rhinorrhea and sore throat.    Eyes:  Negative for pain and visual disturbance.   Respiratory:  Negative for cough and shortness of breath.    Cardiovascular:  Positive for chest pain and palpitations.   Gastrointestinal:  Negative for abdominal pain.   Endocrine: Negative for polydipsia and polyuria.   Genitourinary:  Negative for dysuria and flank pain.   Musculoskeletal:  Negative for arthralgias and myalgias.   Skin:  Negative for rash and wound.   Neurological:  Negative for dizziness, weakness and light-headedness.   Psychiatric/Behavioral:  Negative for suicidal ideas.        Except as noted above the remainder of the review of systems was reviewed and negative.       PAST MEDICAL HISTORY     Past Medical History:   Diagnosis Date    Seasonal allergies          SURGICAL HISTORY       Past Surgical History:   Procedure Laterality Date    ACHILLES TENDON SURGERY Left 2019    APPENDECTOMY  1999    HERNIA REPAIR           CURRENT MEDICATIONS       There are no discharge medications for this patient.      ALLERGIES     Patient has no known allergies.    FAMILY HISTORY     History reviewed. No pertinent family history.       SOCIAL HISTORY

## 2025-05-08 NOTE — ED NOTES
RN to room to round on pt  Pt resting in bed, HR noted in the 70s, gtt titrated down per mar  Patient resting in bed. Respirations easy and unlabored. No distress noted. Call light within reach.

## 2025-05-08 NOTE — H&P
Hospitalist History & Physical         Patient: Kvng Noguera 30 y.o. male      : 1995  Date of Admission: 2025  Date of Service: Pt seen/examined on 25 and Admitted to Observation with expected LOS less than two midnights due to medical therapy.         ASSESSMENT AND PLAN    Palpitations 2/2 New onset A-fib with RVR:   Will defer echo as patient may need to be converted.   Continue Cardizem for now, hold if SBP is < 90 or HR < 60  Start on heparin for now in case patient needs to be converted.  Telemetry   Will make NPO for now in case will need to be cardioverted in am.    Dizziness 2/2 #1: Patient states that dizziness is now better and only occurred when HR was elevated.     Query SHARLENE: creatinine noted to be 1.5. Patient is noted to lift weights.     Noted history of seasonal allergies.        Data reviewed (unless otherwise discussed in assessment/plan)  EKG:  I have reviewed the EKG with the following interpretation: A-fib with RVR  Imaging: I have reviewed CXR with the following interpretation: NO acute process  Labs: Reviewed, see chart and plan above.       =======================================================================    SUBJECTIVE    Chief Complaint:  palpitations and dizziness    History Of Present Illness:  Kvng Noguera is a 30 y.o. male with PMHx of seasonal allergies who presents to [facility] with palpitations and dizziness.  Patient states that after mowing today he has had a weird feeling in his chest. He states that it felt as though his heart was racing. He admits to drinking a couple of cups of coffee a day. States that in the past he was told that he had, \"a funny rhythm, but nothing crazy.\" Denies alcohol, tobacco, and illicit drug use. States that this was likely his first stent of cardio in a long time, which could of contributed to this. States his wife was worried because he was very pale during the event while at home.     ED course: labs  drawn, imaging obtained, cardizem drip started.     Review of Systems:   Pertinent positives and negatives as noted in the HPI. Otherwise complete ROS negative.    OBJECTIVE  Physical Exam:  BP 90/70   Pulse 69   Temp 97.6 °F (36.4 °C)   Resp 18   Ht 1.93 m (6' 4\")   Wt 102.1 kg (225 lb)   SpO2 98%   BMI 27.39 kg/m²     General appearance: No apparent distress, appears stated age.  Eyes:  Pupils equal, round, and reactive to light. Conjunctivae/corneas clear.  HENT: Head normal in appearance. External nares normal.  Oral mucosa moist without lesions.  Hearing grossly intact.   Neck: Supple, with full range of motion. Trachea midline.  No gross JVD appreciated.  Respiratory:  Normal respiratory effort. Clear to auscultation, bilaterally without rales or wheezes or rhonchi.  Cardiovascular: Irregular rate, Irregular rhythm with normal S1/S2 without murmurs.  No lower extremity edema.   Abdomen: Soft, non-tender, non-distended with normal bowel sounds.  Musculoskeletal: No joint swelling or tenderness. Normal tone. No abnormal movements.   Skin: Warm and dry. No rashes or lesions.  Neurologic:  No focal sensory/motor deficits in the upper and lower extremities. Cranial nerves:  grossly non-focal 2-12.     Psychiatric: Alert and oriented, normal insight and thought content.   Capillary Refill: Brisk,< 3 seconds.  Peripheral Pulses: +2 palpable, equal bilaterally.       Medications Prior to Admission:   None     Allergies:  Patient has no known allergies.    Past Medical, Family, Social, Surgical Hx      Diagnosis Date    Seasonal allergies          Procedure Laterality Date    ACHILLES TENDON SURGERY Left 2019    APPENDECTOMY  1999    HERNIA REPAIR       History reviewed. No pertinent family history.  Social History     Socioeconomic History    Marital status:      Spouse name: None    Number of children: None    Years of education: None    Highest education level: None   Tobacco Use    Smoking status:

## 2025-05-08 NOTE — PROCEDURES
PROCEDURE NOTE  Date: 5/8/2025   Name: Kvng Noguera  YOB: 1995    Procedures  12 lead EKG completed. Results handed to Chalo GALINDO.

## 2025-05-08 NOTE — CONSULTS
[facility]'s  Consult    Patient's Name/Date of Birth: Kvng Noguera / 1995 (30 y.o.)    Date: May 8, 2025     Referring Provider: Andres Dias MD    CHIEF COMPLAINT: Afib      HPI: This is a pleasant 30 y.o. male without prior cardiac history presents with Afib RVR.  He had palpitations and dizziness that started after mowing the grass.  He went to take a shower, felt presyncope, heart was beating out of chest.  Had to lay down.  He went to [employer].  Wife said he became pale. Never had previously.  He does drink coffee.  No a/t/d.  No recent sick contacts.  ECG now shows NSR.  No chest pain, angina, COLUNGA, orthopnea, PND, sob at rest, palpitations, LE edema, or syncope.  He was started on Heparin gtt and Diltiazem gtt.  Labs wnl.  TSH wnl.  Hgb 14.  Cr 1.0.  He is on no home meds.  He is a  and is very physically fit.  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 No family hx of SCD.       Echo: No results found for this or any previous visit.       All labs, EKG's, diagnostic testing and images as well as cardiac cath, stress testing were reviewed during this encounter    Past Medical History:   Diagnosis Date    Seasonal allergies      Past Surgical History:   Procedure Laterality Date    ACHILLES TENDON SURGERY Left 2019    APPENDECTOMY  1999    HERNIA REPAIR       Current Facility-Administered Medications   Medication Dose Route Frequency Provider Last Rate Last Admin    sodium chloride flush 0.9 % injection 5-40 mL  5-40 mL IntraVENous 2 times per day Scot Stringer APRN - CNP        sodium chloride flush 0.9 % injection 5-40 mL  5-40 mL IntraVENous PRN Scot Stringer APRN - CNP        0.9 % sodium chloride infusion   IntraVENous PRN Scot Stringer APRN - CNP        potassium chloride (KLOR-CON M) extended release tablet 40 mEq  40 mEq Oral PRN Scot Stringer APRN - CNP        Or    potassium bicarb-citric acid (EFFER-K) effervescent tablet 40 mEq  40

## 2025-06-08 LAB — ECHO BSA: 2.35 M2

## 2025-06-10 LAB — ECHO BSA: 2.35 M2

## 2025-07-22 ENCOUNTER — OFFICE VISIT (OUTPATIENT)
Dept: CARDIOLOGY CLINIC | Age: 30
End: 2025-07-22
Payer: COMMERCIAL

## 2025-07-22 VITALS
HEIGHT: 76 IN | SYSTOLIC BLOOD PRESSURE: 127 MMHG | BODY MASS INDEX: 27.2 KG/M2 | WEIGHT: 223.4 LBS | HEART RATE: 80 BPM | DIASTOLIC BLOOD PRESSURE: 82 MMHG

## 2025-07-22 DIAGNOSIS — I48.0 PAROXYSMAL ATRIAL FIBRILLATION (HCC): Primary | ICD-10-CM

## 2025-07-22 PROCEDURE — 99214 OFFICE O/P EST MOD 30 MIN: CPT | Performed by: PHYSICIAN ASSISTANT

## 2025-07-22 NOTE — PROGRESS NOTES
[facility] [place] [facility]  [facility] CARDIOLOGY  [address]  [address]  [place] [address]  Dept: [phone]  Dept Fax: [phone]  Loc: [phone]    Chief Complaint   Patient presents with    Follow-Up from Hospital     Located within [facility] consulted had 30 day monitor   No cardiac complaints        History of Present Illness  The patient is a 30-year-old gentleman who was admitted to the hospital with new onset atrial fibrillation, which subsequently converted back to normal sinus rhythm. He presents for follow-up.    Since his hospitalization, he has not had any further episodes. He recalls feeling hot while mowing the lawn, but did not notice any symptoms until after he had finished and showered. Even after resting for 15 to 20 minutes, his heart rate remained elevated. His wife reports that his lips were pale and he appeared unwell. After about 5 hours, they checked his blood pressure, which was low at 80/50, and his heart rate was high, ranging from 140s to 150s. His watch also indicated a heart rate consistently over 100 during this time. He consumes a significant amount of caffeine prior to the episode and cooled down rapidly after being overheated. He is currently not on any medications. He maintains a regular exercise routine, primarily focusing on weightlifting and push-ups, with less emphasis on cardiovascular exercises. He acknowledges the need to improve his cardiovascular fitness. As part of his job requirements, he must be able to run 1.5 miles within a certain timeframe every two years, for which he trains consistently for 7 months prior to the test.    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  Which led to a doctor's visit and a diagnosis of athletic heart He was advised to consult a cardiologist but did not follow up.    SOCIAL HISTORY  Marital Status:   Occupations: Highway Patrolman  Exercise: Weightlifting regularly, runs